# Patient Record
Sex: FEMALE | Race: WHITE | URBAN - NONMETROPOLITAN AREA
[De-identification: names, ages, dates, MRNs, and addresses within clinical notes are randomized per-mention and may not be internally consistent; named-entity substitution may affect disease eponyms.]

---

## 2022-12-19 ENCOUNTER — APPOINTMENT (OUTPATIENT)
Dept: URBAN - NONMETROPOLITAN AREA SURGERY 8 | Age: 72
Setting detail: DERMATOLOGY
End: 2022-12-21

## 2022-12-19 VITALS
DIASTOLIC BLOOD PRESSURE: 114 MMHG | HEART RATE: 85 BPM | RESPIRATION RATE: 13 BRPM | SYSTOLIC BLOOD PRESSURE: 155 MMHG | TEMPERATURE: 97.7 F

## 2022-12-19 VITALS
DIASTOLIC BLOOD PRESSURE: 92 MMHG | TEMPERATURE: 97.6 F | SYSTOLIC BLOOD PRESSURE: 144 MMHG | RESPIRATION RATE: 12 BRPM | HEART RATE: 77 BPM

## 2022-12-19 PROBLEM — C44.519 BASAL CELL CARCINOMA OF SKIN OF OTHER PART OF TRUNK: Status: ACTIVE | Noted: 2022-12-19

## 2022-12-19 PROCEDURE — OTHER MIPS QUALITY: OTHER

## 2022-12-19 PROCEDURE — 17313 MOHS 1 STAGE T/A/L: CPT

## 2022-12-19 PROCEDURE — 17314 MOHS ADDL STAGE T/A/L: CPT

## 2022-12-19 PROCEDURE — 17315 MOHS SURG ADDL BLOCK: CPT

## 2022-12-19 PROCEDURE — OTHER MOHS SURGERY: OTHER

## 2022-12-19 PROCEDURE — 12034 INTMD RPR S/TR/EXT 7.6-12.5: CPT

## 2022-12-19 NOTE — PROCEDURE: MOHS SURGERY
Patient resolved from Transition of Care episode on 12/30/21  . ACM/CTN was unsuccessful at contacting this patient today. Patient/family was provided the following resources and education related to COVID-19 during the initial call:                         Signs, symptoms and red flags related to COVID-19            CDC exposure and quarantine guidelines            [facility] exposure contact - [phone]            Contact for their local Department of Health                 Patient has not had any additional ED or hospital visits. No further outreach scheduled with this CTN/ACM. Episode of Care resolved. Patient has this CTN/ACM contact information if future needs arise. Spiral Flap Text: The defect edges were debeveled with a #15 scalpel blade.  Given the location of the defect, shape of the defect and the proximity to free margins a spiral flap was deemed most appropriate.  Using a sterile surgical marker, an appropriate rotation flap was drawn incorporating the defect and placing the expected incisions within the relaxed skin tension lines where possible. The area thus outlined was incised deep to adipose tissue with a #15 scalpel blade.  The skin margins were undermined to an appropriate distance in all directions utilizing iris scissors.

## 2022-12-19 NOTE — PROCEDURE: MOHS SURGERY
What Type Of Note Output Would You Prefer (Optional)?: Standard Output Is The Patient Presenting As Previously Scheduled?: Yes How Severe Is Your Rash?: moderate Is This A New Presentation, Or A Follow-Up?: Rash Additional History: Was given prednisone, looks better, itchy. Here for second opinion. Provider Procedure Text (A): After obtaining clear surgical margins the defect was repaired by another provider.